# Patient Record
Sex: MALE | Race: WHITE
[De-identification: names, ages, dates, MRNs, and addresses within clinical notes are randomized per-mention and may not be internally consistent; named-entity substitution may affect disease eponyms.]

---

## 2020-01-28 ENCOUNTER — HOSPITAL ENCOUNTER (EMERGENCY)
Dept: HOSPITAL 74 - JER | Age: 35
Discharge: HOME | End: 2020-01-28
Payer: COMMERCIAL

## 2020-01-28 VITALS — HEART RATE: 92 BPM | DIASTOLIC BLOOD PRESSURE: 82 MMHG | SYSTOLIC BLOOD PRESSURE: 135 MMHG

## 2020-01-28 VITALS — BODY MASS INDEX: 26.4 KG/M2

## 2020-01-28 VITALS — TEMPERATURE: 98.8 F

## 2020-01-28 DIAGNOSIS — K29.20: Primary | ICD-10-CM

## 2020-01-28 LAB
ALBUMIN SERPL-MCNC: 4.2 G/DL (ref 3.4–5)
ALP SERPL-CCNC: 86 U/L (ref 45–117)
ALT SERPL-CCNC: 42 U/L (ref 13–61)
ANION GAP SERPL CALC-SCNC: 8 MMOL/L (ref 8–16)
AST SERPL-CCNC: 30 U/L (ref 15–37)
BASOPHILS # BLD: 0.4 % (ref 0–2)
BILIRUB SERPL-MCNC: 0.7 MG/DL (ref 0.2–1)
BUN SERPL-MCNC: 8.1 MG/DL (ref 7–18)
CALCIUM SERPL-MCNC: 9.7 MG/DL (ref 8.5–10.1)
CHLORIDE SERPL-SCNC: 103 MMOL/L (ref 98–107)
CO2 SERPL-SCNC: 28 MMOL/L (ref 21–32)
CREAT SERPL-MCNC: 0.6 MG/DL (ref 0.55–1.3)
DEPRECATED RDW RBC AUTO: 13.8 % (ref 11.9–15.9)
EOSINOPHIL # BLD: 0.3 % (ref 0–4.5)
GLUCOSE SERPL-MCNC: 101 MG/DL (ref 74–106)
HCT VFR BLD CALC: 47.2 % (ref 35.4–49)
HGB BLD-MCNC: 16.7 GM/DL (ref 11.7–16.9)
LYMPHOCYTES # BLD: 17.8 % (ref 8–40)
MCH RBC QN AUTO: 31.8 PG (ref 25.7–33.7)
MCHC RBC AUTO-ENTMCNC: 35.3 G/DL (ref 32–35.9)
MCV RBC: 90.1 FL (ref 80–96)
MONOCYTES # BLD AUTO: 6.8 % (ref 3.8–10.2)
NEUTROPHILS # BLD: 74.7 % (ref 42.8–82.8)
PLATELET # BLD AUTO: 379 K/MM3 (ref 134–434)
PMV BLD: 7.1 FL (ref 7.5–11.1)
POTASSIUM SERPLBLD-SCNC: 4.1 MMOL/L (ref 3.5–5.1)
PROT SERPL-MCNC: 8 G/DL (ref 6.4–8.2)
RBC # BLD AUTO: 5.24 M/MM3 (ref 4–5.6)
SODIUM SERPL-SCNC: 139 MMOL/L (ref 136–145)
WBC # BLD AUTO: 10.1 K/MM3 (ref 4–10)

## 2020-01-28 NOTE — PDOC
History of Present Illness





- General


Chief Complaint: Pain


Stated Complaint: ABD PAIN


Time Seen by Provider: 01/28/20 14:33


History Source: Patient


Exam Limitations: No Limitations





- History of Present Illness


Initial Comments: 





01/28/20 16:10


Cole Islas is a 34M with no other PMH presenting with 3 days of epigastric 

and R-sided abdominal pain.





Patient says that for the last 3 days has had intermittent burning epigastric 

and R-sided abdominal pain that worsens with meals. Denies spoiled food, fever, 

chills, nausea, vomiting, sick contacts. Last PO a cheese and meat sandwich. Non

-positional. PSH appendicitis, still has GB. Drinks 1 pint of mixed alcohol per 

night, smokes 0.5ppd cigarettes, marijuana use 3x per week last one week ago. 

No chest pain, SOB, palpitations. No cardiac history, no family history of 

sudden cardiac death.





No known allergies. No medications taken daily.








Past History





- Past Medical History


Allergies/Adverse Reactions: 


 Allergies











Allergy/AdvReac Type Severity Reaction Status Date / Time


 


No Known Allergies Allergy   Verified 01/28/20 14:33











CVA: No


COPD: No


CHF: No





- Surgical History


Appendectomy: Yes





- Immunization History


Immunization Up to Date: Yes





- Psycho Social/Smoking Cessation Hx


Smoking History: Never smoked


Information on smoking cessation initiated: No


Hx Alcohol Use: No


Drug/Substance Use Hx: No





**Review of Systems





- Review of Systems


Able to Perform ROS?: Yes


Constitutional: No: Chills, Fever


HEENTM: No: Symptoms Reported


Respiratory: No: Cough, Shortness of Breath


Cardiac (ROS): No: Chest Pain, Edema, Irregular Heart Rate, Lightheadedness, 

Palpitations, Syncope


ABD/GI: Yes: Other (abdominal pain).  No: Constipated, Diarrhea, Nausea, Poor 

Appetite, Poor Fluid Intake, Vomiting


: No: Symptoms Reported


Musculoskeletal: No: Symptoms Reported


Integumentary: No: Symptoms Reported


Neurological: No: Symptoms reported


Endocrine: No: Symptoms Reported


Hematologic/Lymphatic: No: Symptoms Reported


All Other Systems: Reviewed and Negative





*Physical Exam





- Vital Signs


 Last Vital Signs











Temp Pulse Resp BP Pulse Ox


 


 98.8 F   88   16   135/83   98 


 


 01/28/20 14:35  01/28/20 14:35  01/28/20 14:35  01/28/20 14:35  01/28/20 14:35














- Physical Exam


General Appearance: Yes: Nourished, Appropriately Dressed.  No: Apparent 

Distress


HEENT: positive: EOMI, JOSE GUADALUPE, Normal Voice, Symmetrical, Pharynx Normal, Hearing 

Grossly Normal.  negative: Scleral Icterus (R), Scleral Icterus (L), Pharyngeal 

Erythema, Tonsillar Exudate, Tonsillar Erythema


Neck: positive: Normal Thyroid, Supple.  negative: Tender, Rigid, 

Lymphadenopathy (R), Lymphadenopathy (L)


Respiratory/Chest: positive: Lungs Clear, Normal Breath Sounds, Respiratory 

Distress.  negative: Chest Tender, Accessory Muscle Use, Crackles, Rales, 

Rhonchi, Stridor, Wheezing


Cardiovascular: positive: Regular Rhythm, Regular Rate.  negative: Murmur


Gastrointestinal/Abdominal: positive: Normal Bowel Sounds, Tender (epigastric, 

RUQ, +Simental), Flat, Soft.  negative: Protuberent, Distended, Guarding, Rebound

, Tenderness


Musculoskeletal: positive: Normal Inspection.  negative: CVA Tenderness


Extremity: positive: Normal Capillary Refill, Normal Inspection, Normal Range 

of Motion, Pelvis Stable.  negative: Tender


Integumentary: positive: Normal Color, Dry, Warm


Neurologic: positive: Fully Oriented, Alert, Normal Mood/Affect, Normal Response

, Motor Strength 5/5





ED Treatment Course





- LABORATORY


CBC & Chemistry Diagram: 


 01/28/20 15:25





 01/28/20 15:25





- ADDITIONAL ORDERS


Additional order review: 


 











  01/28/20





  15:25


 


RBC  5.24


 


MCV  90.1


 


MCHC  35.3


 


RDW  13.8


 


MPV  7.1 L


 


Neutrophils %  74.7


 


Lymphocytes %  17.8


 


Monocytes %  6.8


 


Eosinophils %  0.3


 


Basophils %  0.4














- RADIOLOGY


Radiology Studies Ordered: 














 Category Date Time Status


 


 CHEST X-RAY PORTABLE* [RAD] Stat Radiology  01/28/20 15:18 Taken


 


 ABDOMEN US -LIMITED [US] Stat Ultrasound  01/28/20 15:47 Ordered














Medical Decision Making





- Medical Decision Making





01/28/20 18:12


Patient presents with epigastric and RUQ pain in the setting of daily alcohol 

use and fatty food intake, still has GB, has +Simental and epigastric tenderness 

without N/V/D. Symptoms consistent with pancreatitis, GB pathology, gastritis, 

peptic ulcers





- CBC/CMP for lytes/infection eval


- lipase for pancreatitis eval


- CXR/ECG for epigastric pain, r/o cardiac etiology, low suspicion


- RUQ US for eval GB for stones and GB wall thickening





ECG NSR with HR 76, QRS 90, QTc 445, no TWI or ischemic changes.


CXR shows no acute pathology.


US shows fatty liver without BG pathology.





Labs notable for:


- CBC WNL


- CMP WNL


- lipase low





No evidence of cardiac anomaly. No GB pathology or pancreatitis. Likely 

gastritis, likely 2/2 alcohol.


Stable for discharge home with PMD f/u, clinic referral, advice to decrease 

EtOH intake and avoid spicy foods.





Discharge





- Discharge Information


Problems reviewed: Yes


Clinical Impression/Diagnosis: 


 Epigastric abdominal pain





Condition: Stable


Disposition: HOME





- Admission


No





- Follow up/Referral


Referrals: 


[facility] [Provider Group]


Stuart Engle MD [Staff Physician] - 





- Patient Discharge Instructions


Patient Printed Discharge Instructions:  DI for Alcoholic Gastritis


Additional Instructions: 


Today you were evaluated for abdominal pain. Your blood works show that you do 

not have an infection or pancreatitis. Your ultrasound does not show any 

evidence of gallbladder disease. Your ECG and X-ray do not show any heart 

disease. Your symptoms are being caused by gastritis, likely related to alcohol 

use. Please stop drinking alcohol each night to improve your symptoms, also 

avoid spicy foods and eating late at night. Please follow-up with your primary 

doctor, and a referral has been given for a gastroenterologist for further 

care. Try taking Maalox over the counter for stomach pain. If you experience 

worsening abdominal pain, fever, chest pain, diarrhea, or any other new or 

concerning symptoms, please return to the emergency room.





- Post Discharge Activity


Work/Back to School Note:  Back to Work

## 2020-01-28 NOTE — PDOC
Attending Attestation





- Resident


Resident Name: Mckay Sun





- ED Attending Attestation


I have performed the following: I have examined & evaluated the patient, The 

case was reviewed & discussed with the resident, I agree w/resident's findings 

& plan, Exceptions are as noted





- HPI


HPI: 





01/29/20 01:41


24-year-old male presented with epigastric pain





- Physicial Exam


PE: 





01/29/20 01:42


Well-nourished well-developed 34-year-old male no acute distress


Head normocephalic , nontraumatic


Neck supple


Lungs clear to auscultation bilaterally


CVS regular rate and rhythm S1-S2


Abdomen there was no focal tenderness, no rebound, no guarding, abdomen is soft


No flank pain


Skin warm and dry


Neuro alert and oriented x3, ambulating with ease in the emergency department





- Medical Decision Making





01/28/20 17:08


34-year-old male presents with right upper quadrant pain and some burning that 

he experienced after eating.  He does state that he has been drinking a pint of 

alcohol every night.  Past surgical history appendectomy





Differential includes alcoholic gastritis, PUD, gallstones, GERD


01/28/20 17:09








CBC is unremarkable


Chemistries are unremarkable


Chest x-ray no free air under the diaphragm, normal mediastinum, no infiltrates 

or consolidations


01/28/20 17:10

## 2020-01-28 NOTE — PDOC
Rapid Medical Evaluation


Chief Complaint: Pain


Time Seen by Provider: 01/28/20 14:33


Medical Evaluation: 


 Allergies











Allergy/AdvReac Type Severity Reaction Status Date / Time


 


No Known Allergies Allergy   Verified 01/28/20 14:33











01/28/20 14:36


I have performed a brief in-person evaluation of this patient.


The patient presents with a chief complaint of: h/o appendectomy present with 

complains of 3 days h/o N, epigastric and RUQ abd pains. Pt also report 

diarrhea. Denies vomiting, fever, weakness


Pertinent physical exam findings: mild TTP to epigastric and RUQ


I have ordered the following: cbc, cmp, lipase. 


The patient will proceed to the ED for further evaluation.





**Discharge Disposition





- Diagnosis


 Epigastric abdominal pain








- Discharge Dispostion


Condition at time of disposition: Stable





- Referrals





- Patient Instructions





- Post Discharge Activity

## 2020-01-29 NOTE — EKG
Test Reason : 

Blood Pressure : ***/*** mmHG

Vent. Rate : 063 BPM     Atrial Rate : 063 BPM

   P-R Int : 264 ms          QRS Dur : 124 ms

    QT Int : 458 ms       P-R-T Axes : 027 -09 013 degrees

   QTc Int : 468 ms

 

SINUS RHYTHM WITH 1ST DEGREE A-V BLOCK

NON-SPECIFIC INTRA-VENTRICULAR CONDUCTION DELAY

BORDERLINE ECG

NO PREVIOUS ECGS AVAILABLE

Confirmed by Victorino Sterling MD ([id number]) on 1/29/2020 11:00:50 AM

 

Referred By:             Confirmed By:Victorino Sterling MD